# Patient Record
Sex: MALE | Race: WHITE | NOT HISPANIC OR LATINO | Employment: FULL TIME | URBAN - METROPOLITAN AREA
[De-identification: names, ages, dates, MRNs, and addresses within clinical notes are randomized per-mention and may not be internally consistent; named-entity substitution may affect disease eponyms.]

---

## 2017-01-11 ENCOUNTER — APPOINTMENT (OUTPATIENT)
Dept: CT IMAGING | Facility: HOSPITAL | Age: 52
End: 2017-01-11

## 2017-01-11 ENCOUNTER — HOSPITAL ENCOUNTER (EMERGENCY)
Facility: HOSPITAL | Age: 52
Discharge: HOME | End: 2017-01-11
Attending: EMERGENCY MEDICINE | Admitting: EMERGENCY MEDICINE

## 2017-01-11 ENCOUNTER — APPOINTMENT (OUTPATIENT)
Dept: GENERAL RADIOLOGY | Facility: HOSPITAL | Age: 52
End: 2017-01-11

## 2017-01-11 VITALS
OXYGEN SATURATION: 93 % | DIASTOLIC BLOOD PRESSURE: 66 MMHG | TEMPERATURE: 98.1 F | RESPIRATION RATE: 18 BRPM | WEIGHT: 183 LBS | SYSTOLIC BLOOD PRESSURE: 108 MMHG | HEART RATE: 77 BPM | BODY MASS INDEX: 27.11 KG/M2 | HEIGHT: 69 IN

## 2017-01-11 DIAGNOSIS — R07.9 CHEST PAIN, UNSPECIFIED TYPE: Primary | ICD-10-CM

## 2017-01-11 LAB
ALBUMIN SERPL-MCNC: 4.2 G/DL (ref 3.2–4.8)
ALBUMIN/GLOB SERPL: 1.2 G/DL (ref 1.5–2.5)
ALP SERPL-CCNC: 67 U/L (ref 25–100)
ALT SERPL W P-5'-P-CCNC: 43 U/L (ref 7–40)
ANION GAP SERPL CALCULATED.3IONS-SCNC: 8 MMOL/L (ref 3–11)
AST SERPL-CCNC: 59 U/L (ref 0–33)
BASOPHILS # BLD AUTO: 0.01 10*3/MM3 (ref 0–0.2)
BASOPHILS NFR BLD AUTO: 0.2 % (ref 0–1)
BILIRUB SERPL-MCNC: 0.7 MG/DL (ref 0.3–1.2)
BNP SERPL-MCNC: 5 PG/ML (ref 0–100)
BUN BLD-MCNC: 21 MG/DL (ref 9–23)
BUN/CREAT SERPL: 23.3 (ref 7–25)
CALCIUM SPEC-SCNC: 9.5 MG/DL (ref 8.7–10.4)
CHLORIDE SERPL-SCNC: 100 MMOL/L (ref 99–109)
CO2 SERPL-SCNC: 34 MMOL/L (ref 20–31)
CREAT BLD-MCNC: 0.9 MG/DL (ref 0.6–1.3)
D DIMER PPP FEU-MCNC: 0.68 MG/L (FEU) (ref 0–0.5)
DEPRECATED RDW RBC AUTO: 40.9 FL (ref 37–54)
EOSINOPHIL # BLD AUTO: 0.09 10*3/MM3 (ref 0.1–0.3)
EOSINOPHIL NFR BLD AUTO: 1.4 % (ref 0–3)
ERYTHROCYTE [DISTWIDTH] IN BLOOD BY AUTOMATED COUNT: 12.8 % (ref 11.3–14.5)
GFR SERPL CREATININE-BSD FRML MDRD: 89 ML/MIN/1.73
GLOBULIN UR ELPH-MCNC: 3.4 GM/DL
GLUCOSE BLD-MCNC: 93 MG/DL (ref 70–100)
HCT VFR BLD AUTO: 40.8 % (ref 38.9–50.9)
HGB BLD-MCNC: 14.5 G/DL (ref 13.1–17.5)
HOLD SPECIMEN: NORMAL
HOLD SPECIMEN: NORMAL
IMM GRANULOCYTES # BLD: 0.01 10*3/MM3 (ref 0–0.03)
IMM GRANULOCYTES NFR BLD: 0.2 % (ref 0–0.6)
LIPASE SERPL-CCNC: 44 U/L (ref 6–51)
LYMPHOCYTES # BLD AUTO: 1.32 10*3/MM3 (ref 0.6–4.8)
LYMPHOCYTES NFR BLD AUTO: 21.1 % (ref 24–44)
MCH RBC QN AUTO: 31.3 PG (ref 27–31)
MCHC RBC AUTO-ENTMCNC: 35.5 G/DL (ref 32–36)
MCV RBC AUTO: 87.9 FL (ref 80–99)
MONOCYTES # BLD AUTO: 0.5 10*3/MM3 (ref 0–1)
MONOCYTES NFR BLD AUTO: 8 % (ref 0–12)
NEUTROPHILS # BLD AUTO: 4.33 10*3/MM3 (ref 1.5–8.3)
NEUTROPHILS NFR BLD AUTO: 69.1 % (ref 41–71)
PLATELET # BLD AUTO: 206 10*3/MM3 (ref 150–450)
PMV BLD AUTO: 8.6 FL (ref 6–12)
POTASSIUM BLD-SCNC: 3.5 MMOL/L (ref 3.5–5.5)
PROT SERPL-MCNC: 7.6 G/DL (ref 5.7–8.2)
RBC # BLD AUTO: 4.64 10*6/MM3 (ref 4.2–5.76)
SODIUM BLD-SCNC: 142 MMOL/L (ref 132–146)
TROPONIN I SERPL-MCNC: 0 NG/ML (ref 0–0.07)
TROPONIN I SERPL-MCNC: <0.006 NG/ML
WBC NRBC COR # BLD: 6.26 10*3/MM3 (ref 3.5–10.8)
WHOLE BLOOD HOLD SPECIMEN: NORMAL
WHOLE BLOOD HOLD SPECIMEN: NORMAL

## 2017-01-11 PROCEDURE — 83690 ASSAY OF LIPASE: CPT | Performed by: EMERGENCY MEDICINE

## 2017-01-11 PROCEDURE — 85025 COMPLETE CBC W/AUTO DIFF WBC: CPT | Performed by: EMERGENCY MEDICINE

## 2017-01-11 PROCEDURE — 99284 EMERGENCY DEPT VISIT MOD MDM: CPT

## 2017-01-11 PROCEDURE — 93005 ELECTROCARDIOGRAM TRACING: CPT | Performed by: EMERGENCY MEDICINE

## 2017-01-11 PROCEDURE — 93005 ELECTROCARDIOGRAM TRACING: CPT

## 2017-01-11 PROCEDURE — 80053 COMPREHEN METABOLIC PANEL: CPT | Performed by: EMERGENCY MEDICINE

## 2017-01-11 PROCEDURE — 84484 ASSAY OF TROPONIN QUANT: CPT

## 2017-01-11 PROCEDURE — 84484 ASSAY OF TROPONIN QUANT: CPT | Performed by: EMERGENCY MEDICINE

## 2017-01-11 PROCEDURE — 36415 COLL VENOUS BLD VENIPUNCTURE: CPT

## 2017-01-11 PROCEDURE — 71020 HC CHEST PA AND LATERAL: CPT

## 2017-01-11 PROCEDURE — 85379 FIBRIN DEGRADATION QUANT: CPT | Performed by: EMERGENCY MEDICINE

## 2017-01-11 PROCEDURE — 83880 ASSAY OF NATRIURETIC PEPTIDE: CPT | Performed by: EMERGENCY MEDICINE

## 2017-01-11 PROCEDURE — 0 IOPAMIDOL PER 1 ML: Performed by: EMERGENCY MEDICINE

## 2017-01-11 PROCEDURE — 71275 CT ANGIOGRAPHY CHEST: CPT

## 2017-01-11 RX ORDER — SODIUM CHLORIDE 0.9 % (FLUSH) 0.9 %
10 SYRINGE (ML) INJECTION AS NEEDED
Status: DISCONTINUED | OUTPATIENT
Start: 2017-01-11 | End: 2017-01-12 | Stop reason: HOSPADM

## 2017-01-11 RX ORDER — ASPIRIN 81 MG/1
324 TABLET, CHEWABLE ORAL ONCE
Status: DISCONTINUED | OUTPATIENT
Start: 2017-01-11 | End: 2017-01-12 | Stop reason: HOSPADM

## 2017-01-11 RX ADMIN — IOPAMIDOL 60 ML: 755 INJECTION, SOLUTION INTRAVENOUS at 22:43

## 2017-01-12 NOTE — ED PROVIDER NOTES
Subjective   History of Present Illness    Review of Systems    History reviewed. No pertinent past medical history.    No Known Allergies    Past Surgical History   Procedure Laterality Date   • Back surgery         History reviewed. No pertinent family history.    Social History     Social History   • Marital status:      Spouse name: N/A   • Number of children: N/A   • Years of education: N/A     Social History Main Topics   • Smoking status: Never Smoker   • Smokeless tobacco: None   • Alcohol use No   • Drug use: No   • Sexual activity: Not Asked     Other Topics Concern   • None     Social History Narrative   • None         Objective   Physical Exam    Procedures         ED Course  ED Course       Course of Care      Lab Results (last 24 hours)     Procedure Component Value Units Date/Time    CBC & Differential [[id number]] Collected:  01/11/17 1800    Specimen:  Blood Updated:  01/11/17 1813    Narrative:       The following orders were created for panel order CBC & Differential.  Procedure                               Abnormality         Status                     ---------                               -----------         ------                     CBC Auto Differential[[id number]]         Abnormal            Final result                 Please view results for these tests on the individual orders.    Comprehensive Metabolic Panel [[id number]]  (Abnormal) Collected:  01/11/17 1800    Specimen:  Blood Updated:  01/11/17 1835     Glucose 93 mg/dL      BUN 21 mg/dL      Creatinine 0.90 mg/dL      Sodium 142 mmol/L      Potassium 3.5 mmol/L      Chloride 100 mmol/L      CO2 34.0 (H) mmol/L      Calcium 9.5 mg/dL      Total Protein 7.6 g/dL      Albumin 4.20 g/dL      ALT (SGPT) 43 (H) U/L      AST (SGOT) 59 (H) U/L      Alkaline Phosphatase 67 U/L      Total Bilirubin 0.7 mg/dL      eGFR Non African Amer 89 mL/min/1.73      Globulin 3.4 gm/dL      A/G Ratio 1.2 (L) g/dL      BUN/Creatinine Ratio 23.3       Anion Gap 8.0 mmol/L     Narrative:       National Kid[employer] Guidelines    Stage                           Description                             GFR                      1                               Normal or High                          90+  2                               Mild decrease                            60-89  3                               Moderate decrease                   30-59  4                               Severe decrease                       15-29  5                               Kidney failure                             <15    Lipase [[id number]]  (Normal) Collected:  01/11/17 1800    Specimen:  Blood Updated:  01/11/17 1835     Lipase 44 U/L     BNP [[id number]]  (Normal) Collected:  01/11/17 1800    Specimen:  Blood Updated:  01/11/17 1835     BNP 5.0 pg/mL     CBC Auto Differential [[id number]]  (Abnormal) Collected:  01/11/17 1800    Specimen:  Blood Updated:  01/11/17 1813     WBC 6.26 10*3/mm3      RBC 4.64 10*6/mm3      Hemoglobin 14.5 g/dL      Hematocrit 40.8 %      MCV 87.9 fL      MCH 31.3 (H) pg      MCHC 35.5 g/dL      RDW 12.8 %      RDW-SD 40.9 fl      MPV 8.6 fL      Platelets 206 10*3/mm3      Neutrophil % 69.1 %      Lymphocyte % 21.1 (L) %      Monocyte % 8.0 %      Eosinophil % 1.4 %      Basophil % 0.2 %      Immature Grans % 0.2 %      Neutrophils, Absolute 4.33 10*3/mm3      Lymphocytes, Absolute 1.32 10*3/mm3      Monocytes, Absolute 0.50 10*3/mm3      Eosinophils, Absolute 0.09 (L) 10*3/mm3      Basophils, Absolute 0.01 10*3/mm3      Immature Grans, Absolute 0.01 10*3/mm3     Troponin [[id number]]  (Normal) Collected:  01/11/17 1800    Specimen:  Blood Updated:  01/11/17 1839     Troponin I <0.006 ng/mL     Narrative:       Ultra Troponin I Reference Range:         <=0.039 ng/mL: Negative    0.04-0.779 ng/mL: Indeterminate Range. Suspicious of MI.  Clinical correlation required.       >=0.78  ng/mL: Consistent with myocardial injury.  Clinical correlation  "required.          Note: In addition to lab results from this visit, the labs listed above may include labs taken at another facility or during a different encounter within the last 24 hours. Please correlate lab times with ED admission and discharge times for further clarification of the services performed during this visit.    XR Chest 2 View   Preliminary Result   Evidence of old granulomatous disease and perhaps minimal   right basilar lung scarring.       DICTATED:     01/11/2017   EDITED:          01/11/2017              Vitals:    01/11/17 1741 01/11/17 1900   BP: 151/82 114/74   BP Location: Left arm    Patient Position: Sitting    Pulse: 78 76   Resp: 18    Temp: 98.1 °F (36.7 °C)    TempSrc: Oral    SpO2: 98% 96%   Weight: 183 lb (83 kg)    Height: 69\" (175.3 cm)        Medications   sodium chloride 0.9 % flush 10 mL (not administered)   aspirin chewable tablet 324 mg (324 mg Oral Not Given 1/11/17 1756)       ECG/EMG Results (last 24 hours)     Procedure Component Value Units Date/Time    ECG 12 Lead [[id number]] Collected:  01/11/17 1748     Updated:  01/11/17 1903    Narrative:       Test Reason : CHEST PAIN  Blood Pressure : **/** mmHG  Vent. Rate : 090 BPM     Atrial Rate : 090 BPM     P-R Int : 138 ms          QRS Dur : 082 ms      QT Int : 350 ms       P-R-T Axes : 049 033 031 degrees     QTc Int : 428 ms    Normal sinus rhythm  No previous ECGs available  Confirmed by ARIAN ROSENBAUM ([id number]) on 1/11/2017 7:03:41 PM    Referred By:  TRIAGE           Confirmed By:ARIAN ROSENBAUM                          [facility]    Final diagnoses:   None       Documentation assistance provided by maria elena Castañeda.  Information recorded by the maria elena was done at my direction and has been verified and validated by me.     Madisyn Castañeda  01/11/17 1914    "

## 2017-01-12 NOTE — ED PROVIDER NOTES
Subjective   HPI Comments: Mr. Whitten is a 51 y.o male presents to the ED c/o chest pain. He has generalized intermittent chest tightness that has been going on since yesterday. Today his pain also radiated to up his neck and through to his shoulder. He describes this pain as more of a burning pain. He also notes that two days ago he was fighting a stomach bug and had some sweating and chilling, but denies any other acute sx at this time. He does have a hx of HLD, Mitral Valve Prolapse, and fhx of heart diease, denies any hx of HTN or DM.      Patient is a 51 y.o. male presenting with chest pain.   History provided by:  Patient  Chest Pain   Pain location:  Unable to specify  Pain quality: burning and tightness    Pain radiates to:  Neck, L shoulder and R shoulder  Pain severity:  Mild  Onset quality:  Sudden  Duration: Onset yesterday.  Timing:  Constant  Progression:  Unchanged  Chronicity:  New  Relieved by:  None tried  Worsened by:  Nothing  Ineffective treatments:  None tried  Associated symptoms: abdominal pain and diaphoresis    Associated symptoms: no back pain and no fever    Risk factors: high cholesterol    Risk factors: no coronary artery disease, no diabetes mellitus, no hypertension, no prior DVT/PE, no smoking and no surgery        Review of Systems   Constitutional: Positive for chills and diaphoresis. Negative for fever.   Cardiovascular: Positive for chest pain.   Gastrointestinal: Positive for abdominal pain.   Musculoskeletal: Positive for neck pain. Negative for back pain.   All other systems reviewed and are negative.      History reviewed. No pertinent past medical history.    No Known Allergies    Past Surgical History   Procedure Laterality Date   • Back surgery         History reviewed. No pertinent family history.    Social History     Social History   • Marital status:      Spouse name: N/A   • Number of children: N/A   • Years of education: N/A     Social History Main Topics   •  Smoking status: Never Smoker   • Smokeless tobacco: None   • Alcohol use No   • Drug use: No   • Sexual activity: Not Asked     Other Topics Concern   • None     Social History Narrative   • None         Objective   Physical Exam   Constitutional: He is oriented to person, place, and time. He appears well-developed and well-nourished.  Non-toxic appearance. No distress.   HENT:   Head: Normocephalic and atraumatic.   Right Ear: External ear normal.   Left Ear: External ear normal.   Nose: Nose normal.   Mouth/Throat: Oropharynx is clear and moist.   Eyes: Conjunctivae, EOM and lids are normal. Pupils are equal, round, and reactive to light.   Neck: Normal range of motion. Neck supple. No tracheal deviation present.   Cardiovascular: Normal rate, regular rhythm, normal heart sounds and intact distal pulses.  Exam reveals no gallop, no friction rub and no decreased pulses.    No murmur heard.  Pulmonary/Chest: Effort normal and breath sounds normal. No respiratory distress. He has no decreased breath sounds. He has no wheezes. He has no rhonchi. He has no rales.   Abdominal: Soft. Normal appearance and bowel sounds are normal. There is no tenderness. There is no rebound and no guarding.   Musculoskeletal: Normal range of motion. He exhibits no edema or deformity.   Right calf 1.5 cm larger than left.   Lymphadenopathy:     He has no cervical adenopathy.   Neurological: He is alert and oriented to person, place, and time. He has normal strength. No cranial nerve deficit or sensory deficit.   Skin: Skin is warm and dry. No rash noted. He is not diaphoretic.   Psychiatric: He has a normal mood and affect. His speech is normal and behavior is normal. Judgment and thought content normal. Cognition and memory are normal.   Nursing note and vitals reviewed.      Procedures         ED Course  ED Course       Course of Care      Lab Results (last 24 hours)     Procedure Component Value Units Date/Time    CBC & Differential  [[id number]] Collected:  01/11/17 1800    Specimen:  Blood Updated:  01/11/17 1813    Narrative:       The following orders were created for panel order CBC & Differential.  Procedure                               Abnormality         Status                     ---------                               -----------         ------                     CBC Auto Differential[[id number]]         Abnormal            Final result                 Please view results for these tests on the individual orders.    Comprehensive Metabolic Panel [[id number]]  (Abnormal) Collected:  01/11/17 1800    Specimen:  Blood Updated:  01/11/17 1835     Glucose 93 mg/dL      BUN 21 mg/dL      Creatinine 0.90 mg/dL      Sodium 142 mmol/L      Potassium 3.5 mmol/L      Chloride 100 mmol/L      CO2 34.0 (H) mmol/L      Calcium 9.5 mg/dL      Total Protein 7.6 g/dL      Albumin 4.20 g/dL      ALT (SGPT) 43 (H) U/L      AST (SGOT) 59 (H) U/L      Alkaline Phosphatase 67 U/L      Total Bilirubin 0.7 mg/dL      eGFR Non African Amer 89 mL/min/1.73      Globulin 3.4 gm/dL      A/G Ratio 1.2 (L) g/dL      BUN/Creatinine Ratio 23.3      Anion Gap 8.0 mmol/L     Narrative:       National Kidney Foundation Guidelines    Stage                           Description                             GFR                      1                               Normal or High                          90+  2                               Mild decrease                            60-89  3                               Moderate decrease                   30-59  4                               Severe decrease                       15-29  5                               Kidney failure                             <15    Lipase [[id number]]  (Normal) Collected:  01/11/17 1800    Specimen:  Blood Updated:  01/11/17 1835     Lipase 44 U/L     BNP [[id number]]  (Normal) Collected:  01/11/17 1800    Specimen:  Blood Updated:  01/11/17 1835     BNP 5.0 pg/mL     CBC Auto Differential  [[id number]]  (Abnormal) Collected:  01/11/17 1800    Specimen:  Blood Updated:  01/11/17 1813     WBC 6.26 10*3/mm3      RBC 4.64 10*6/mm3      Hemoglobin 14.5 g/dL      Hematocrit 40.8 %      MCV 87.9 fL      MCH 31.3 (H) pg      MCHC 35.5 g/dL      RDW 12.8 %      RDW-SD 40.9 fl      MPV 8.6 fL      Platelets 206 10*3/mm3      Neutrophil % 69.1 %      Lymphocyte % 21.1 (L) %      Monocyte % 8.0 %      Eosinophil % 1.4 %      Basophil % 0.2 %      Immature Grans % 0.2 %      Neutrophils, Absolute 4.33 10*3/mm3      Lymphocytes, Absolute 1.32 10*3/mm3      Monocytes, Absolute 0.50 10*3/mm3      Eosinophils, Absolute 0.09 (L) 10*3/mm3      Basophils, Absolute 0.01 10*3/mm3      Immature Grans, Absolute 0.01 10*3/mm3     Troponin [[id number]]  (Normal) Collected:  01/11/17 1800    Specimen:  Blood Updated:  01/11/17 1839     Troponin I <0.006 ng/mL     Narrative:       Ultra Troponin I Reference Range:         <=0.039 ng/mL: Negative    0.04-0.779 ng/mL: Indeterminate Range. Suspicious of MI.  Clinical correlation required.       >=0.78  ng/mL: Consistent with myocardial injury.  Clinical correlation required.    D-dimer, Quantitative [[id number]]  (Abnormal) Collected:  01/11/17 1800    Specimen:  Blood Updated:  01/11/17 2148     D-Dimer, Quantitative 0.68 (H) mg/L (FEU)     Narrative:       Negative predictive value for exclusion of venous thromboembolism: < or = 0.5 mg/L (FEU)    POC Troponin, Rapid [[id number]]  (Normal) Collected:  01/11/17 2109    Specimen:  Blood Updated:  01/11/17 2125     Troponin I 0.00 ng/mL       Serial Number: [id number]    : [id number]             Note: In addition to lab results from this visit, the labs listed above may include labs taken at another facility or during a different encounter within the last 24 hours. Please correlate lab times with ED admission and discharge times for further clarification of the services performed during this visit.    XR Chest 2 View   Final Result    Evidence of old granulomatous disease and perhaps minimal   right basilar lung scarring.       DICTATED:     01/11/2017   EDITED:          01/11/2017       This report was finalized on 1/11/2017 10:17 PM by DR. Iker Lauren MD.          CT Angiogram Chest With Contrast    (Results Pending)       Vitals:    01/11/17 2000 01/11/17 2030 01/11/17 2130 01/11/17 2200   BP: 125/77 131/78 118/85 114/84   BP Location:       Patient Position:       Pulse: 83 81 80 80   Resp:       Temp:       TempSrc:       SpO2: 95% 95% 96% 95%   Weight:       Height:           Medications   sodium chloride 0.9 % flush 10 mL (not administered)   aspirin chewable tablet 324 mg (324 mg Oral Not Given 1/11/17 1756)   iopamidol (ISOVUE-370) 76 % injection 100 mL (60 mL Intravenous Given 1/11/17 2243)       ECG/EMG Results (last 24 hours)     Procedure Component Value Units Date/Time    ECG 12 Lead [[id number]] Collected:  01/11/17 1748     Updated:  01/11/17 1903    Narrative:       Test Reason : CHEST PAIN  Blood Pressure : **/** mmHG  Vent. Rate : 090 BPM     Atrial Rate : 090 BPM     P-R Int : 138 ms          QRS Dur : 082 ms      QT Int : 350 ms       P-R-T Axes : 049 033 031 degrees     QTc Int : 428 ms    Normal sinus rhythm  No previous ECGs available  Confirmed by ARIAN ROSENBAUM ([id number]) on 1/11/2017 7:03:41 PM    Referred By:  JAQUELINE           Confirmed By:ARIAN ROSENBAUM    ECG 12 Lead [[id number]] Collected:  01/11/17 2053     Updated:  01/11/17 2058    Narrative:       Test Reason : repeat  Blood Pressure : **/** mmHG  Vent. Rate : 086 BPM     Atrial Rate : 086 BPM     P-R Int : 138 ms          QRS Dur : 080 ms      QT Int : 354 ms       P-R-T Axes : 050 034 031 degrees     QTc Int : 423 ms    Normal sinus rhythm  When compared with ECG of 11-JAN-2017 17:48,  No significant change was found  Confirmed by ARIAN ROSENBAUM ([id number]) on 1/11/2017 8:57:52 PM    Referred By:  ILSA           Confirmed By:ARIAN ROSENBAUM                           MDM  Number of Diagnoses or Management Options  Chest pain, unspecified type: new and requires workup  Diagnosis management comments: Normal serial cardiac enzymes and EKGs.    Normal CXR.     D-dimer elevated, CT angio performed and shows no acute disease.     Will DC with advise chest pain clinic followup.            Amount and/or Complexity of Data Reviewed  Clinical lab tests: ordered and reviewed  Tests in the radiology section of CPT®: ordered and reviewed  Obtain history from someone other than the patient: yes  Review and summarize past medical records: yes  Independent visualization of images, tracings, or specimens: yes    Patient Progress  Patient progress: stable      Final diagnoses:   Chest pain, unspecified type       Documentation assistance provided by maria elena CROSS.  Information recorded by the maria elena was done at my direction and has been verified and validated by me.     Mellisa Cross  01/11/17 9277       Mellisa Cross  01/11/17 2246       Siva Steel MD  01/11/17 3906

## 2017-01-16 ENCOUNTER — TELEPHONE (OUTPATIENT)
Dept: CARDIOLOGY | Facility: HOSPITAL | Age: 52
End: 2017-01-16

## 2017-01-16 NOTE — TELEPHONE ENCOUNTER
There has been several attempts to contact patient over past week to schedule appointment for f/u from recent ER, chest pain visit.  Voice messages have been left, with no return call.  A letter from our clinic has been sent out to patient with information on how to make appointment if needed.   Melly Perry RN

## 2017-03-21 ENCOUNTER — TRANSCRIBE ORDERS (OUTPATIENT)
Dept: ULTRASOUND IMAGING | Facility: HOSPITAL | Age: 52
End: 2017-03-21

## 2017-03-21 DIAGNOSIS — R10.11 RUQ PAIN: Primary | ICD-10-CM

## 2017-03-24 ENCOUNTER — HOSPITAL ENCOUNTER (OUTPATIENT)
Dept: ULTRASOUND IMAGING | Facility: HOSPITAL | Age: 52
Discharge: HOME | End: 2017-03-24
Admitting: INTERNAL MEDICINE

## 2017-03-24 DIAGNOSIS — R10.11 RUQ PAIN: ICD-10-CM

## 2017-03-24 PROCEDURE — 76705 ECHO EXAM OF ABDOMEN: CPT
